# Patient Record
Sex: MALE | Race: WHITE | Employment: FULL TIME | URBAN - METROPOLITAN AREA
[De-identification: names, ages, dates, MRNs, and addresses within clinical notes are randomized per-mention and may not be internally consistent; named-entity substitution may affect disease eponyms.]

---

## 2020-07-15 ENCOUNTER — HOSPITAL ENCOUNTER (EMERGENCY)
Age: 55
Discharge: HOME | End: 2020-07-15
Attending: EMERGENCY MEDICINE
Payer: COMMERCIAL

## 2020-07-15 ENCOUNTER — APPOINTMENT (OUTPATIENT)
Dept: GENERAL RADIOLOGY | Age: 55
End: 2020-07-15
Payer: COMMERCIAL

## 2020-07-15 VITALS
OXYGEN SATURATION: 97 % | BODY MASS INDEX: 27.46 KG/M2 | HEART RATE: 84 BPM | TEMPERATURE: 97.1 F | RESPIRATION RATE: 14 BRPM | DIASTOLIC BLOOD PRESSURE: 88 MMHG | WEIGHT: 155 LBS | SYSTOLIC BLOOD PRESSURE: 156 MMHG | HEIGHT: 63 IN

## 2020-07-15 PROCEDURE — 99282 EMERGENCY DEPT VISIT SF MDM: CPT

## 2020-07-15 PROCEDURE — 90715 TDAP VACCINE 7 YRS/> IM: CPT | Performed by: PHYSICIAN ASSISTANT

## 2020-07-15 PROCEDURE — 6370000000 HC RX 637 (ALT 250 FOR IP): Performed by: PHYSICIAN ASSISTANT

## 2020-07-15 PROCEDURE — 12002 RPR S/N/AX/GEN/TRNK2.6-7.5CM: CPT

## 2020-07-15 PROCEDURE — 6360000002 HC RX W HCPCS: Performed by: PHYSICIAN ASSISTANT

## 2020-07-15 PROCEDURE — 96374 THER/PROPH/DIAG INJ IV PUSH: CPT

## 2020-07-15 PROCEDURE — 90471 IMMUNIZATION ADMIN: CPT | Performed by: PHYSICIAN ASSISTANT

## 2020-07-15 PROCEDURE — 73140 X-RAY EXAM OF FINGER(S): CPT

## 2020-07-15 RX ORDER — CEPHALEXIN 500 MG/1
500 CAPSULE ORAL 4 TIMES DAILY
Qty: 40 CAPSULE | Refills: 0 | Status: SHIPPED | OUTPATIENT
Start: 2020-07-15 | End: 2020-07-25

## 2020-07-15 RX ORDER — MORPHINE SULFATE 4 MG/ML
4 INJECTION, SOLUTION INTRAMUSCULAR; INTRAVENOUS ONCE
Status: COMPLETED | OUTPATIENT
Start: 2020-07-15 | End: 2020-07-15

## 2020-07-15 RX ORDER — THROMB-CAL-CELL-DRESSING,HEMOS 3" X 3"
1 PADS, MEDICATED (EA) TOPICAL PRN
Status: DISCONTINUED | OUTPATIENT
Start: 2020-07-15 | End: 2020-07-15 | Stop reason: HOSPADM

## 2020-07-15 RX ORDER — LIDOCAINE HYDROCHLORIDE 10 MG/ML
20 INJECTION, SOLUTION INFILTRATION; PERINEURAL ONCE
Status: DISCONTINUED | OUTPATIENT
Start: 2020-07-15 | End: 2020-07-15 | Stop reason: HOSPADM

## 2020-07-15 RX ORDER — HYDROCODONE BITARTRATE AND ACETAMINOPHEN 5; 325 MG/1; MG/1
1 TABLET ORAL EVERY 4 HOURS PRN
Qty: 12 TABLET | Refills: 0 | Status: SHIPPED | OUTPATIENT
Start: 2020-07-15 | End: 2020-07-18

## 2020-07-15 RX ORDER — OXYCODONE HYDROCHLORIDE AND ACETAMINOPHEN 5; 325 MG/1; MG/1
1 TABLET ORAL ONCE
Status: DISCONTINUED | OUTPATIENT
Start: 2020-07-15 | End: 2020-07-15

## 2020-07-15 RX ORDER — CEPHALEXIN 250 MG/1
500 CAPSULE ORAL ONCE
Status: COMPLETED | OUTPATIENT
Start: 2020-07-15 | End: 2020-07-15

## 2020-07-15 RX ADMIN — TETANUS TOXOID, REDUCED DIPHTHERIA TOXOID AND ACELLULAR PERTUSSIS VACCINE, ADSORBED 0.5 ML: 5; 2.5; 8; 8; 2.5 SUSPENSION INTRAMUSCULAR at 13:04

## 2020-07-15 RX ADMIN — MORPHINE SULFATE 4 MG: 4 INJECTION, SOLUTION INTRAMUSCULAR; INTRAVENOUS at 13:01

## 2020-07-15 RX ADMIN — CEPHALEXIN 500 MG: 250 CAPSULE ORAL at 13:05

## 2020-07-15 ASSESSMENT — PAIN SCALES - GENERAL: PAINLEVEL_OUTOF10: 8

## 2020-07-15 NOTE — ED TRIAGE NOTES
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 \" Pt stated that sensation is intact and ROM is intact. No signs of bleeding; bandage in place per another facility. Wife present. No further complaints at this time.

## 2020-07-15 NOTE — ED PROVIDER NOTES
[address] ED  eMERGENCY dEPARTMENT eNCOUnter   Independent Attestation     Pt Name: Skye Palencia  MRN: [id number]  [place] 1965  Date of evaluation: 7/15/20       Skye Palencia is a 54 y.o. male who presents with Laceration (Reports that he \"sand blasted\" his thumb \"down to the bone. \" Occured while at work using a  on fenders. Reports sensation is intact to this digit. No further complaints at this time.)        Based on the medical record, the care appears appropriate. I was personally available for consultation in the Emergency Department.     Jesus Thomas MD  Attending Emergency  Physician                 Jesus Thomas MD  07/15/20 [phone]

## 2020-07-15 NOTE — ED PROVIDER NOTES
[address]  eMERGENCY dEPARTMENT eNCOUnter      Pt Name: Hamzah Allen  MRN: [id number]  [place] 1965  Date of evaluation: 7/15/2020  Provider: Madhuri Yeung PA-C    CHIEF COMPLAINT       Chief Complaint   Patient presents with    Laceration     Reports that he \"sand blasted\" his thumb \"down to the bone. \" Occured while at work using a  on fenders. Reports sensation is intact to this digit. No further complaints at this time. HISTORY OF PRESENT ILLNESS  (Location/Symptom, Timing/Onset, Context/Setting, Quality, Duration, Modifying Factors, Severity.)   Hamzah Allen is a 54 y.o. male who presents to the emergency department c/o laceration to left thumb. States they were at work and sandblasted his thumb down to the bone while working on [employer]. Denies any numbness or tingling. Denies any other complaints. States he has FROM. No other complaints. Up to date on tetanus: unknown        Nursing Notes were reviewed. REVIEW OF SYSTEMS    (2-9 systems for level 4, 10 or more for level 5)     Review of Systems   Laceration to left thumb     Except as noted above the remainder of the review of systems was reviewed and negative. PAST MEDICAL HISTORY   History reviewed. No pertinent past medical history. None otherwise stated in nurses notes    SURGICAL HISTORY     History reviewed. No pertinent surgical history. None otherwise stated in nurses notes    CURRENT MEDICATIONS       Previous Medications    No medications on file       ALLERGIES     Nitroglycerin    FAMILY HISTORY     History reviewed. No pertinent family history. No family status information on file.       None otherwise stated in nurses notes    SOCIAL HISTORY         lives at home with others     PHYSICAL EXAM    (up to 7 for level 4, 8 or more for level 5)     ED Triage Vitals [07/15/20 1221]   BP Temp Temp Source Pulse Resp SpO2 Height Weight   (!) 156/88 97.1 °F (36.2 °C) Temporal 84 14 97 % 5' 3\" (1.6 m) 155 lb (70.3 kg)       Physical Exam   Nursing note and vitals reviewed. Constitutional: Oriented to person, place, and time and well-developed, well-nourished. Head: Normocephalic and atraumatic. Ear: External ears normal.   Nose: Nose normal and midline. Eyes: Conjunctivae and EOM are normal. Pupils are equal, round, and reactive to light. Neck: Normal range of motion. Cardiovascular: Normal rate, regular rhythm, normal heart sounds and intact distal pulses. Pulmonary/Chest: Effort normal and breath sounds normal. No respiratory distress. No wheezes. No rales. No chest tenderness. Musculoskeletal: Examination of left thumb reveals avulsion of skin from dorsal aspect. There is FROM. 5/5 strength. Brisk cap refill. Distal sensation intact. 2/2 radial pulse. Neurological: Alert and oriented to person, place, and time. GCS score is 15. Skin: 2cm x 2cm avulsion of skin over dorsal aspect of proximal phalanx. Bone and underlying structure are visualized. There appears to be no ligament/ tendon damage. Mild venous bleeding. Psychiatric: Mood, memory, affect and judgment normal.           DIAGNOSTIC RESULTS         RADIOLOGY:   All plain film, CT, MRI, and formal ultrasound images (except ED bedside ultrasound) are read by the radiologist, see reports below, unless otherwise noted in MDM or here. No results found. LABS:  Labs Reviewed - No data to display    All other labs were within normal range or not returned as of this dictation. EMERGENCY DEPARTMENT COURSE and DIFFERENTIAL DIAGNOSIS/MDM:   Vitals:    Vitals:    07/15/20 1221   BP: (!) 156/88   Pulse: 84   Resp: 14   Temp: 97.1 °F (36.2 °C)   TempSrc: Temporal   SpO2: 97%   Weight: 155 lb (70.3 kg)   Height: 5' 3\" (1.6 m)           PROCEDURES:  Laceration Repair Procedure Note    Indication: Laceration    Procedure:  The patient was placed in the appropriate position and anesthesia around the laceration was obtained by infiltration using 1% Lidocaine without epinephrine. The area was then debrided, irrigated with normal saline and explored with foreign material removed which had the appearance of sand. The wound area was then dressed with thrombi pad and pressure dressing. Total repaired wound length: 2cm x 2cm. Other Items: None    The patient tolerated the procedure well. He did get lightheaded and had to lay down. No syncopal event. Complications: None        Avulsion of skin to left thumb. Underlying structures are visible. No signs of any tendon damage. xrays are unremarkable. Tetanus updated. Will start on keflex, norco.   Thrombi pad applied. Wound care instructions given. Referred to Dr. Taylor Flower. Importance of follow up discussed with patient. Pt agrees. Instructed to return for signs of infection including redness, swelling, fevers chills, increased pain, red streaking, pus drainage. ED MEDS:  Orders Placed This Encounter   Medications    DISCONTD: oxyCODONE-acetaminophen (PERCOCET) 5-325 MG per tablet 1 tablet    cephALEXin (KEFLEX) capsule 500 mg    Tetanus-Diphth-Acell Pertussis (BOOSTRIX) injection 0.5 mL    morphine sulfate (PF) injection 4 mg    lidocaine 1 % injection 20 mL    Thrombi-Pad 3\"X3\" PADS 1 each    HYDROcodone-acetaminophen (NORCO) 5-325 MG per tablet     Sig: Take 1 tablet by mouth every 4 hours as needed for Pain for up to 3 days. Intended supply: 3 days. Take lowest dose possible to manage pain     Dispense:  12 tablet     Refill:  0    cephALEXin (KEFLEX) 500 MG capsule     Sig: Take 1 capsule by mouth 4 times daily for 10 days     Dispense:  40 capsule     Refill:  0         CONSULTS:  None          FINAL IMPRESSION      1.  Avulsion of skin of finger, initial encounter          DISPOSITION/PLAN   DISPOSITION Decision To Discharge    PATIENT REFERRED TO:  Carmina Linda MD  [address] [facility] [address] [facility]  [place] [id number]  [facility] [address] [facility]  [phone]            DISCHARGE MEDICATIONS:  New Prescriptions    CEPHALEXIN (KEFLEX) 500 MG CAPSULE    Take 1 capsule by mouth 4 times daily for 10 days    HYDROCODONE-ACETAMINOPHEN (NORCO) 5-325 MG PER TABLET    Take 1 tablet by mouth every 4 hours as needed for Pain for up to 3 days. Intended supply: 3 days.  Take lowest dose possible to manage pain       (Please note that portions of this note were completed with a voice recognition program.  Efforts were made to edit the dictations but occasionally words are mis-transcribed.)    Shazia Tong. Shahram [address], PA-C  07/15/20 [id number]

## 2020-07-17 ENCOUNTER — OFFICE VISIT (OUTPATIENT)
Dept: ORTHOPEDIC SURGERY | Age: 55
End: 2020-07-17
Payer: COMMERCIAL

## 2020-07-17 VITALS — HEIGHT: 63 IN | WEIGHT: 155 LBS | BODY MASS INDEX: 27.46 KG/M2

## 2020-07-17 PROCEDURE — 99203 OFFICE O/P NEW LOW 30 MIN: CPT | Performed by: ORTHOPAEDIC SURGERY

## 2020-07-17 ASSESSMENT — ENCOUNTER SYMPTOMS
CONSTIPATION: 0
DIARRHEA: 0
COUGH: 0
NAUSEA: 0

## 2020-07-17 NOTE — PROGRESS NOTES
[facility]  [facility]  [address]  [facility] [address]  Dept: [phone]    Ambulatory Orthopedic New Patient Visit      CHIEF COMPLAINT:    Chief Complaint   Patient presents with    Finger Injury     left thumb [facility] DOI: 7/15/20       HISTORY OF PRESENT ILLNESS:      The patient is a 54 y.o. male who is being seen  for consultation and evaluation of left thumb injury. Lily Leiva is a 54 y.o. male who presents to the office today with left thumb pain after a work-related injury sustained on 7/15/20. Patient states he was using the sandblaster while working on fenders at work when caught his thumb with the sandblaster. He presented to [address] [place] [address] same day, an exam and x-rays were done. The laceration was debrided and irrigated with saline and then was dressed with thrombi pad and a pressure dressing. Patient was then instructed to follow up with an Orthopedic. Patient has been off of work since the injury. Past Medical History:    No past medical history on file. Past Surgical History:    No past surgical history on file. Current Medications:   Current Outpatient Medications   Medication Sig Dispense Refill    cephALEXin (KEFLEX) 500 MG capsule Take 1 capsule by mouth 4 times daily for 10 days 40 capsule 0     No current facility-administered medications for this visit.         Allergies:    Nitroglycerin    Social History:   Social History     Socioeconomic History    Marital status:      Spouse name: Not on file    Number of children: Not on file    Years of education: Not on file    Highest education level: Not on file   Occupational History    Not on file   Social Needs    Financial resource strain: Not on file    Food insecurity     Worry: Not on file     Inability: Not on file    Transportation needs     Medical: Not on file     Non-medical: Not on file   Tobacco Use    Smoking status: Never Smoker    Smokeless tobacco: Never Used Substance and Sexual Activity    Alcohol use: Not on file    Drug use: Not on file    Sexual activity: Not on file   Lifestyle    Physical activity     Days per week: Not on file     Minutes per session: Not on file    Stress: Not on file   Relationships    Social connections     Talks on phone: Not on file     Gets together: Not on file     Attends Episcopal service: Not on file     Active member of club or organization: Not on file     Attends meetings of clubs or organizations: Not on file     Relationship status: Not on file    Intimate partner violence     Fear of current or ex partner: Not on file     Emotionally abused: Not on file     Physically abused: Not on file     Forced sexual activity: Not on file   Other Topics Concern    Not on file   Social History Narrative    Not on file       Family History:  No family history on file. REVIEW OF SYSTEMS:  Review of Systems   Constitutional: Negative for chills and fever. Respiratory: Negative for cough. Gastrointestinal: Negative for constipation, diarrhea and nausea. Musculoskeletal: Positive for arthralgias (left thumb). Negative for gait problem, joint swelling and myalgias. Neurological: Negative for dizziness, weakness and numbness. I have reviewed the CC, HPI, ROS, PMH, FHX, Social History. I agree with the documentation provided by other staff, residents and havereviewed their documentation prior to providing my signature indicating agreement. PHYSICAL EXAM:  Ht 5' 3\" (1.6 m)   Wt 155 lb (70.3 kg)   BMI 27.46 kg/m²  Body mass index is 27.46 kg/m². Physical Exam  Gen: alert and oriented  Psych:  Appropriate affect; Appropriate knowledge base; Appropriate mood; No hallucinations; Head: normocephalic atraumatic   Chest: symmetric chest excursion  Pelvis: stable  Ortho Exam  Extremity: 2 cm x 1 cm x 0.5 cm skin avulsion dorsal aspect of the proximal phalanx of the left thumb is appreciated.   Some skin maceration and mild contamination of the skin is appreciated. No signs of infection are appreciated. Flexor extensor tendons are grossly intact. Motor, sensory, vascular examination of the left upper extremity is grossly intact without focal deficits. No tenderness over the remaining metacarpals or phalanges is appreciated. Radiology:     Xr Finger Left (min 2 Views)    Result Date: 7/15/2020  EXAMINATION: THREE XRAY VIEWS OF THE LEFT FINGERS 7/15/2020 1:06 pm COMPARISON: None. HISTORY: ORDERING SYSTEM PROVIDED HISTORY: injury TECHNOLOGIST PROVIDED HISTORY: injury Reason for Exam: Pt states he almost sand blasted off thumb today at work Acuity: Acute Type of Exam: Initial Mechanism of Injury: Pt states he almost sand blasted off thumb today at work FINDINGS: Bandage overlying left thumb limits assessment; localized small radiopaque foreign bodies likely imbedded within swollen soft tissue identified, latter seen best posteriorly extending laterally and medially. No definite fracture. No dislocation. Assessment for soft tissue lucency limited due to the overlying bandage. Tiny radiopaque foreign bodies also noted tip 4th finger and under the nails of the 1st, 3rd and 5th fingers. Triscaphe/carpal 1st and 2nd metacarpal degenerative changes noted. No fracture or dislocation, with special attention to the thumb. Soft tissue assessment thumb limited by overlying bandage with probable imbedded tiny soft tissue foreign bodies (presumably related to the sand blasting). Degenerative and other findings, as above. RECOMMENDATION: Consider follow-up imaging when bandaging can be removed. ASSESSMENT:     1. Avulsion of skin of left thumb, initial encounter         PLAN:     Reviewed radiologies from the [facility] ED. Discussed etiology and natural history of left thumb skin avulsion.   The treatment options may include oral anti-inflammatories, bracing, injections, advanced imaging, activity modification, physical therapy and/or surgical intervention. The patient would like to proceed with doing wet to dressing changes of the left thumb BID and continuing Keflex. Instructed the patient and his wife on the wet to dry dressing changes. Patient can RTW with the restriction of predominately right handed work only and should stay in a clean and dry environment. Mozella Idol was completed and given to the patient as we have no MCO on file. The patient stated that he would have his  call us Monday (7/20/20). The patient will follow up in 10 days. We discussed that the patient should call us with any concerns or questions. No follow-ups on file. No orders of the defined types were placed in this encounter. No orders of the defined types were placed in this encounter. Janette Rae MA am scribing for and in the presence of Dr. Gina Gonzalez  7/17/2020 10:10 AM    I have reviewed and made changes accordingly to the work scribed by Jacob Ordoñez MA. The documentation accurately reflects work and decisions made by me. I have also reviewed documentation completed by clinical staff.     Gina Gonzalez DO, [address] [facility]  7/19/2020 3:00 PM    This note is created with the assistance of a speech recognition program.  While intending to generate a document that actually reflects the content of the visit, the document can still have some errors including those of syntax and sound a like substitutions which may escape proof reading.  In such instances, actual meaning can be extrapolated by contextual diversion      Electronically signed by Francisca Vasquez on 7/19/2020 at 3:00 PM

## 2020-07-31 ENCOUNTER — OFFICE VISIT (OUTPATIENT)
Dept: ORTHOPEDIC SURGERY | Age: 55
End: 2020-07-31
Payer: COMMERCIAL

## 2020-07-31 VITALS — BODY MASS INDEX: 27.46 KG/M2 | WEIGHT: 155 LBS | HEIGHT: 63 IN

## 2020-07-31 PROCEDURE — 99213 OFFICE O/P EST LOW 20 MIN: CPT | Performed by: ORTHOPAEDIC SURGERY

## 2020-07-31 ASSESSMENT — ENCOUNTER SYMPTOMS
VOMITING: 0
COUGH: 0
ABDOMINAL PAIN: 0
APNEA: 0
CHEST TIGHTNESS: 0

## 2020-07-31 NOTE — PROGRESS NOTES
[facility]  [address] [place] [address] [address] [facility]  Dept: [phone]  Dept Fax: [phone]        Ambulatory Follow Up      Subjective:   Alex Montenegro is a 54y.o. year old male who presents to our office today for routine followup regarding his   1. Avulsion of skin of left thumb, subsequent encounter    . Chief Complaint   Patient presents with    Hand Injury     left thumb [facility] DOI 7/15/20       HPI- Patient in the office today in follow up for left thumb avulsion that was sustained during a work related injury on 7/15/2020. The patient was last seen on 7/17/2020 and underwent treatment in the form of wet to dry dressing changes BID and antibiotics. The patient's wife has been completing wet to dry dressing changes once a day. Patient has been working on range of motion of the right thumb. The patient has been working with restrictions of predominately right handed work only and should stay in a clean and dry environment. Patient did not bring any worker's compensation information at this time. Review of Systems   Constitutional: Negative for chills and fever. Respiratory: Negative for apnea, cough and chest tightness. Cardiovascular: Negative for chest pain and palpitations. Gastrointestinal: Negative for abdominal pain and vomiting. Genitourinary: Negative for difficulty urinating. Musculoskeletal: Positive for arthralgias (left thumb). Negative for gait problem, joint swelling and myalgias. Neurological: Negative for dizziness, weakness and numbness. I have reviewed the CC, HPI, ROS, PMH, FHX, Social History, and if not present in this note, I have reviewed in the patient's chart. I agree with the documentation provided by other staff and have reviewed their documentation prior to providing my signature indicating agreement.     Objective :   Ht 5' 3\" (1.6 m)   Wt 155 lb (70.3 kg)   BMI 27.46 kg/m²  Body mass index is 27.46 kg/m². General: Lily Leiva is a 54 y.o. male who is alert and oriented and sitting comfortably in our office. Ortho Exam  MS:  1.5 x 1.5 skin avulsion dorsal left thumb. Skin avulsion is healing well by secondary intention with good granulation bed. No gross signs of infection appreciated. Patient has nearly full range of motion of the left thumb. Motor, sensory, vascular examination of the left upper extremity is grossly intact without focal deficits. Neuro: alert and oriented to person and place. Eyes: Extra-ocular muscles intact  Mouth: Oral mucosa moist. No perioral lesions  Pulm: Respirations unlabored and regular. Symmetric chest excursion without outward deformity is noted. Skin: warm, well perfused  Psych:   Patient has good fund of knowledge and displays understanging of exam, diagnosis, and plan. Radiology: No x-rays were taken in office today. Media Information      Document Information     Wound       07/31/2020 08:15    Attached To: Office Visit on 7/31/20 with Reyna Bella, DO    Source Information     Berl Nones, DO  [facility]          Assessment:      1. Avulsion of skin of left thumb, subsequent encounter       Plan:      Discussed a possible skin graft to the thumb but since the thumb is healing so well I would like to continue with wet to dry dressing changes. The patient to continue working with the same work restrictions of predominately right handed work only and should stay in a clean and dry environment and follow up in the office in 2-3 weeks. However, if he notes any changes to the thumb he should come in sooner to be evaluated. [employer]-14 was completed and handed to the patient in the office today as no MCO is on file for the patient. Follow up:Return in about 2 weeks (around 8/14/2020). No orders of the defined types were placed in this encounter. No orders of the defined types were placed in this encounter.     I,

## 2020-08-21 ENCOUNTER — OFFICE VISIT (OUTPATIENT)
Dept: ORTHOPEDIC SURGERY | Age: 55
End: 2020-08-21
Payer: COMMERCIAL

## 2020-08-21 VITALS — HEIGHT: 63 IN | BODY MASS INDEX: 27.46 KG/M2 | WEIGHT: 155 LBS

## 2020-08-21 PROCEDURE — 99213 OFFICE O/P EST LOW 20 MIN: CPT | Performed by: ORTHOPAEDIC SURGERY

## 2020-08-21 ASSESSMENT — ENCOUNTER SYMPTOMS
COUGH: 0
APNEA: 0
VOMITING: 0
CHEST TIGHTNESS: 0
ABDOMINAL PAIN: 0

## 2020-08-21 NOTE — PROGRESS NOTES
[facility]  [address]  Dept: [phone]  Dept Fax: [phone]        Ambulatory Follow Up      Subjective:   Nikko Ohara is a 54y.o. year old male who presents to our office today for routine followup regarding his   1. Avulsion of skin of left thumb, subsequent encounter    . Chief Complaint   Patient presents with    Finger Pain     left thumb       HPI- Patient in the office today in follow up for left thumb avulsion that was sustained during a work related injury on 7/15/2020. Patient was last seen on 7/31/2020 and was advised to complete wet to dry dressing changes to the thumb. The patient was completing wet to dry dressing changes but now feels that it is healed enough to leave open to air. patient feels the thumb is getting better with time. The patient notes that the thumb is tight still but he has been working on range of motion of the thumb often. Patient has started working with no restrictions on his own. Review of Systems   Constitutional: Negative for chills and fever. Respiratory: Negative for apnea, cough and chest tightness. Cardiovascular: Negative for chest pain and palpitations. Gastrointestinal: Negative for abdominal pain and vomiting. Genitourinary: Negative for difficulty urinating. Musculoskeletal: Positive for arthralgias (left thumb). Negative for gait problem, joint swelling and myalgias. Neurological: Negative for dizziness, weakness and numbness. I have reviewed the CC, HPI, ROS, PMH, FHX, Social History, and if not present in this note, I have reviewed in the patient's chart. I agree with the documentation provided by other staff and have reviewed their documentation prior to providing my signature indicating agreement. Objective :   Ht 5' 3\" (1.6 m)   Wt 155 lb (70.3 kg)   BMI 27.46 kg/m²  Body mass index is 27.46 kg/m².   General: Nikko Ohara is a 54 y.o. male who is alert and oriented and sitting comfortably in our office. Ortho Exam  MS: Area of skin loss over the dorsal aspect left thumb is almost completely granulated in. Patient has mild limitations of flexion of his thumb but only minimally. Motor, sensory, vascular examination left upper extremity grossly intact without focal deficits. Neuro: alert and oriented to person and place. Eyes: Extra-ocular muscles intact  Mouth: Oral mucosa moist. No perioral lesions  Pulm: Respirations unlabored and regular. Symmetric chest excursion without outward deformity is noted. Skin: warm, well perfused  Psych:   Patient has good fund of knowledge and displays understanging of exam, diagnosis, and plan. Media Information      Document Information     Wound       08/21/2020 07:55    Attached To: Office Visit on 8/21/20 with Roger Villela DO    Source Information     Roger Villela DO  [facility]          Assessment:      1. Avulsion of skin of left thumb, subsequent encounter       Plan:      Discussed with the patient that he is okay to complete all activities as work as tolerates. The patient should continue to keep the thumb clean and dry. The patient does not need to keep thumb covered unless he knows he will be working with anything unclean. The patient voices understanding. The patient can return to work today without any restrictions. [employer]-14 was given to patient today in the office. The patient should follow up in 4-6 weeks for final wound check then will discuss MMI. Follow up:Return in about 4 weeks (around 9/18/2020). No orders of the defined types were placed in this encounter. No orders of the defined types were placed in this encounter. Lindsey Rhodes LPN am scribing for and in the presence of Dr. Alpa Pena  8/23/2020 3:27 PM      I have reviewed and made changes accordingly to the work scribed by Maggi Armstrong LPN.   The documentation accurately reflects work and decisions made by me. I have also reviewed documentation completed by clinical staff.     Angelina Tijerina DO, 73 [facility]  8/23/2020 3:28 PM    This note is created with the assistance of a speech recognition program.  While intending to generate a document that actually reflects the content of the visit, the document can still have some errors including those of syntax and sound a like substitutions which may escape proof reading.  In such instances, actual meaning can be extrapolated by contextual diversion      Electronically signed by Darryle Parsons on 8/23/2020 at 3:27 PM